# Patient Record
Sex: MALE | URBAN - METROPOLITAN AREA
[De-identification: names, ages, dates, MRNs, and addresses within clinical notes are randomized per-mention and may not be internally consistent; named-entity substitution may affect disease eponyms.]

---

## 2024-07-25 ENCOUNTER — LAB OUTSIDE AN ENCOUNTER (OUTPATIENT)
Dept: URBAN - METROPOLITAN AREA CLINIC 54 | Facility: CLINIC | Age: 59
End: 2024-07-25

## 2024-07-25 ENCOUNTER — OFFICE VISIT (OUTPATIENT)
Dept: URBAN - METROPOLITAN AREA CLINIC 54 | Facility: CLINIC | Age: 59
End: 2024-07-25
Payer: OTHER GOVERNMENT

## 2024-07-25 VITALS
SYSTOLIC BLOOD PRESSURE: 122 MMHG | HEIGHT: 73 IN | HEART RATE: 85 BPM | BODY MASS INDEX: 17.1 KG/M2 | DIASTOLIC BLOOD PRESSURE: 78 MMHG | TEMPERATURE: 98.1 F | WEIGHT: 129 LBS

## 2024-07-25 DIAGNOSIS — R19.5 PALE STOOL: ICD-10-CM

## 2024-07-25 DIAGNOSIS — E87.6 HYPOKALEMIA: ICD-10-CM

## 2024-07-25 DIAGNOSIS — K22.10 EROSIVE ESOPHAGITIS: ICD-10-CM

## 2024-07-25 DIAGNOSIS — Z12.11 COLON CANCER SCREENING: ICD-10-CM

## 2024-07-25 PROBLEM — 40719004: Status: ACTIVE | Noted: 2024-07-25

## 2024-07-25 PROCEDURE — 99204 OFFICE O/P NEW MOD 45 MIN: CPT

## 2024-07-25 PROCEDURE — 99244 OFF/OP CNSLTJ NEW/EST MOD 40: CPT

## 2024-07-25 RX ORDER — PANTOPRAZOLE SODIUM 40 MG/1
1 TABLET TABLET, DELAYED RELEASE ORAL ONCE A DAY
Status: ACTIVE | COMMUNITY

## 2024-07-25 RX ORDER — SILDENAFIL 100 MG/1
1 TABLET AS NEEDED TABLET, FILM COATED ORAL ONCE A DAY
Status: ACTIVE | COMMUNITY

## 2024-07-25 RX ORDER — POTASSIUM CHLORIDE 20 MEQ/15ML
15 ML WITH FOOD SOLUTION ORAL ONCE A DAY
Status: ACTIVE | COMMUNITY

## 2024-07-25 RX ORDER — PANTOPRAZOLE SODIUM 40 MG/1
1 TABLET TABLET, DELAYED RELEASE ORAL ONCE A DAY
OUTPATIENT

## 2024-07-25 RX ORDER — MAG HYDROX/ALUMINUM HYD/SIMETH 400-400-40
10 ML AS NEEDED SUSPENSION, ORAL (FINAL DOSE FORM) ORAL TWICE A DAY
Status: ACTIVE | COMMUNITY

## 2024-07-25 RX ORDER — FLUTICASONE PROPIONATE 50 UG/1
1 SPRAY IN EACH NOSTRIL SPRAY, METERED NASAL ONCE A DAY
Status: ACTIVE | COMMUNITY

## 2024-07-25 NOTE — HPI-TODAY'S VISIT:
7/25/24: [facility] [place] [facility] without [facility]. A copy of this document will be sent to the provider. Pt was in [facility] 6/8 - 6/12 with abd pain, bloating, nausea with vomiting up blood/ "black stuff," and fever starting 2 days prior to arrival fever. Hypokalemic, now on potassium. Had EGD 6/10 with severe esophagitis (mucosal sloughing 1/3 distal esophagus with erythema 6cm proximal?), extensive erythema in fundus, and mild duodenitis. Bx of esophagus showed ulceration with exetensive fibrpurulent exudate, no mucosa, no malinancy, fungal organisms favored oral. Otherwise bx confirmed mild duodenitis, normal gastric mucosa, no H pylori. Pt is feeling fine now, denies chronic heartburn, reflux. Has mild dysphagia. Epigastric pain has improved, denies n/v. No black stool but says it is iris colored. Denies NSAIDs, no H pylori, takes Meloxicam a few times a month but otherwise no NSAIDs. Taking pantoprazole 40 QD. No prior cscope.

## 2024-07-26 LAB
ALBUMIN/GLOBULIN RATIO: 1.1
ALBUMIN: 4
ALKALINE PHOSPHATASE: 52
ALT (SGPT): 12
AST (SGOT): 16
BILIRUBIN, DIRECT: 0.1
BILIRUBIN, INDIRECT: 0.5
BILIRUBIN, TOTAL: 0.6
GLOBULIN: 3.5
POTASSIUM: 4
PROTEIN, TOTAL: 7.5

## 2024-07-27 ENCOUNTER — DASHBOARD ENCOUNTERS (OUTPATIENT)
Age: 59
End: 2024-07-27

## 2024-07-31 ENCOUNTER — TELEPHONE ENCOUNTER (OUTPATIENT)
Dept: URBAN - METROPOLITAN AREA CLINIC 54 | Facility: CLINIC | Age: 59
End: 2024-07-31

## 2024-08-20 ENCOUNTER — TELEPHONE ENCOUNTER (OUTPATIENT)
Dept: URBAN - METROPOLITAN AREA CLINIC 54 | Facility: CLINIC | Age: 59
End: 2024-08-20

## 2024-08-20 RX ORDER — POLYETHYLENE GLYCOL 3350, SODIUM SULFATE ANHYDROUS, SODIUM BICARBONATE, SODIUM CHLORIDE, POTASSIUM CHLORIDE 236; 22.74; 6.74; 5.86; 2.97 G/4L; G/4L; G/4L; G/4L; G/4L
4000 MILLITERS POWDER, FOR SOLUTION ORAL ONCE
Qty: 4000 MILLILITER | Refills: 0 | OUTPATIENT
Start: 2024-08-20 | End: 2024-08-21

## 2024-09-24 ENCOUNTER — OFFICE VISIT (OUTPATIENT)
Dept: URBAN - METROPOLITAN AREA SURGERY CENTER 14 | Facility: SURGERY CENTER | Age: 59
End: 2024-09-24

## 2024-10-17 ENCOUNTER — OFFICE VISIT (OUTPATIENT)
Dept: URBAN - METROPOLITAN AREA CLINIC 54 | Facility: CLINIC | Age: 59
End: 2024-10-17

## 2024-12-02 ENCOUNTER — OFFICE VISIT (OUTPATIENT)
Dept: URBAN - METROPOLITAN AREA SURGERY CENTER 14 | Facility: SURGERY CENTER | Age: 59
End: 2024-12-02
Payer: OTHER GOVERNMENT

## 2024-12-02 ENCOUNTER — TELEPHONE ENCOUNTER (OUTPATIENT)
Dept: URBAN - METROPOLITAN AREA CLINIC 54 | Facility: CLINIC | Age: 59
End: 2024-12-02

## 2024-12-02 DIAGNOSIS — Z12.11 COLON CANCER SCREENING: ICD-10-CM

## 2024-12-02 DIAGNOSIS — D12.3 ADENOMA OF TRANSVERSE COLON: ICD-10-CM

## 2024-12-02 DIAGNOSIS — K21.00 REFLUX ESOPHAGITIS: ICD-10-CM

## 2024-12-02 DIAGNOSIS — R13.19 DYSPHAGIA: ICD-10-CM

## 2024-12-02 DIAGNOSIS — D12.3 BENIGN NEOPLASM OF TRANSVERSE COLON: ICD-10-CM

## 2024-12-02 DIAGNOSIS — K22.2 ESOPHAGEAL STENOSIS: ICD-10-CM

## 2024-12-02 DIAGNOSIS — K22.89 OTHER SPECIFIED DISEASE OF ESOPHAGUS: ICD-10-CM

## 2024-12-02 PROCEDURE — 43220 ESOPHAGOSCOPY BALLOON <30MM: CPT | Performed by: INTERNAL MEDICINE

## 2024-12-02 PROCEDURE — 45385 COLONOSCOPY W/LESION REMOVAL: CPT | Performed by: INTERNAL MEDICINE

## 2024-12-02 PROCEDURE — 43202 ESOPHAGOSCOPY FLEX BIOPSY: CPT | Performed by: INTERNAL MEDICINE

## 2024-12-02 PROCEDURE — 00813 ANES UPR LWR GI NDSC PX: CPT | Performed by: NURSE ANESTHETIST, CERTIFIED REGISTERED

## 2024-12-02 RX ORDER — MAG HYDROX/ALUMINUM HYD/SIMETH 400-400-40
10 ML AS NEEDED SUSPENSION, ORAL (FINAL DOSE FORM) ORAL TWICE A DAY
Status: ACTIVE | COMMUNITY

## 2024-12-02 RX ORDER — FLUTICASONE PROPIONATE 50 UG/1
1 SPRAY IN EACH NOSTRIL SPRAY, METERED NASAL ONCE A DAY
Status: ACTIVE | COMMUNITY

## 2024-12-02 RX ORDER — SILDENAFIL 100 MG/1
1 TABLET AS NEEDED TABLET, FILM COATED ORAL ONCE A DAY
Status: ACTIVE | COMMUNITY

## 2024-12-02 RX ORDER — POTASSIUM CHLORIDE 20 MEQ/15ML
15 ML WITH FOOD SOLUTION ORAL ONCE A DAY
Status: ACTIVE | COMMUNITY

## 2024-12-02 RX ORDER — PANTOPRAZOLE SODIUM 40 MG/1
1 TABLET TABLET, DELAYED RELEASE ORAL ONCE A DAY
OUTPATIENT

## 2024-12-02 RX ORDER — OMEPRAZOLE 40 MG/1
1 CAPSULE CAPSULE, DELAYED RELEASE ORAL TWICE DAILY
Qty: 180 | Refills: 0 | OUTPATIENT
Start: 2024-12-02

## 2024-12-02 RX ORDER — PANTOPRAZOLE SODIUM 40 MG/1
1 TABLET TABLET, DELAYED RELEASE ORAL ONCE A DAY
Status: ACTIVE | COMMUNITY

## 2024-12-02 RX ORDER — SUCRALFATE 1 G/10ML
10 ML 1 HOUR BEFORE MEALS AND AT BEDTIME ON AN EMPTY STOMACH SUSPENSION ORAL
Qty: 1200 | Refills: 0 | OUTPATIENT
Start: 2024-12-02 | End: 2025-01-01

## 2024-12-03 ENCOUNTER — TELEPHONE ENCOUNTER (OUTPATIENT)
Dept: URBAN - METROPOLITAN AREA CLINIC 54 | Facility: CLINIC | Age: 59
End: 2024-12-03

## 2024-12-03 RX ORDER — SUCRALFATE 1 G/10ML
10 ML 1 HOUR BEFORE MEALS AND AT BEDTIME ON AN EMPTY STOMACH SUSPENSION ORAL
Qty: 1200 | Refills: 0
Start: 2024-12-02 | End: 2025-01-02

## 2024-12-03 RX ORDER — OMEPRAZOLE 40 MG/1
1 CAPSULE CAPSULE, DELAYED RELEASE ORAL TWICE DAILY
Qty: 180 | Refills: 0
Start: 2024-12-02

## 2024-12-04 ENCOUNTER — TELEPHONE ENCOUNTER (OUTPATIENT)
Dept: URBAN - METROPOLITAN AREA CLINIC 54 | Facility: CLINIC | Age: 59
End: 2024-12-04

## 2024-12-09 ENCOUNTER — OFFICE VISIT (OUTPATIENT)
Dept: URBAN - METROPOLITAN AREA CLINIC 54 | Facility: CLINIC | Age: 59
End: 2024-12-09
Payer: OTHER GOVERNMENT

## 2024-12-09 ENCOUNTER — LAB OUTSIDE AN ENCOUNTER (OUTPATIENT)
Dept: URBAN - METROPOLITAN AREA CLINIC 54 | Facility: CLINIC | Age: 59
End: 2024-12-09

## 2024-12-09 VITALS
HEART RATE: 94 BPM | BODY MASS INDEX: 16.72 KG/M2 | TEMPERATURE: 98.1 F | HEIGHT: 73 IN | WEIGHT: 126.2 LBS | DIASTOLIC BLOOD PRESSURE: 83 MMHG | SYSTOLIC BLOOD PRESSURE: 120 MMHG

## 2024-12-09 DIAGNOSIS — K22.10 EROSIVE ESOPHAGITIS: ICD-10-CM

## 2024-12-09 DIAGNOSIS — K22.2 ESOPHAGEAL STENOSIS: ICD-10-CM

## 2024-12-09 PROBLEM — 300286002: Status: ACTIVE | Noted: 2024-12-09

## 2024-12-09 PROCEDURE — 99214 OFFICE O/P EST MOD 30 MIN: CPT

## 2024-12-09 RX ORDER — FLUTICASONE PROPIONATE 50 UG/1
1 SPRAY IN EACH NOSTRIL SPRAY, METERED NASAL ONCE A DAY
Status: ON HOLD | COMMUNITY

## 2024-12-09 RX ORDER — SILDENAFIL 100 MG/1
1 TABLET AS NEEDED TABLET, FILM COATED ORAL ONCE A DAY
Status: ON HOLD | COMMUNITY

## 2024-12-09 RX ORDER — SUCRALFATE 1 G/10ML
10 ML 1 HOUR BEFORE MEALS AND AT BEDTIME ON AN EMPTY STOMACH SUSPENSION ORAL
Qty: 1200 | Refills: 0 | Status: ON HOLD | COMMUNITY
Start: 2024-12-02 | End: 2025-01-02

## 2024-12-09 RX ORDER — MAG HYDROX/ALUMINUM HYD/SIMETH 400-400-40
10 ML AS NEEDED SUSPENSION, ORAL (FINAL DOSE FORM) ORAL TWICE A DAY
Status: ACTIVE | COMMUNITY

## 2024-12-09 RX ORDER — OMEPRAZOLE 40 MG/1
1 CAPSULE CAPSULE, DELAYED RELEASE ORAL TWICE DAILY
Qty: 180 | Refills: 0 | Status: ON HOLD | COMMUNITY
Start: 2024-12-02

## 2024-12-09 RX ORDER — POTASSIUM CHLORIDE 20 MEQ/15ML
15 ML WITH FOOD SOLUTION ORAL ONCE A DAY
Status: ON HOLD | COMMUNITY

## 2024-12-09 NOTE — HPI-TODAY'S VISIT:
7/25/24: [facility] [place] [facility] without [facility]. A copy of this document will be sent to the provider. Pt was in [facility] 6/8 - 6/12 with abd pain, bloating, nausea with vomiting up blood/ "black stuff," and fever starting 2 days prior to arrival fever. Hypokalemic, now on potassium. Had EGD 6/10 with severe esophagitis (mucosal sloughing 1/3 distal esophagus with erythema 6cm proximal?), extensive erythema in fundus, and mild duodenitis. Bx of esophagus showed ulceration with exetensive fibrpurulent exudate, no mucosa, no malinancy, fungal organisms favored oral. Otherwise bx confirmed mild duodenitis, normal gastric mucosa, no H pylori. Pt is feeling fine now, denies chronic heartburn, reflux. Has mild dysphagia. Epigastric pain has improved, denies n/v. No black stool but says it is iris colored. Denies NSAIDs, no H pylori, takes Meloxicam a few times a month but otherwise no NSAIDs. Taking pantoprazole 40 QD. No prior cscope.  12/9/24 Follow Up: Patient returns for EGD/cscope follow up, detailed below. Severe esophageal stenosis noted at 6mm diameter, dilated with CRE balloon to max 7.5 mm. Pt has had some improvement in dysphagia, but still only tolerates pureed diet (yogurt, mashed potatoes, ice cream). Switched from pantoprazole 40 QD to omeprazole 40 BID and carafate suspension 1g QID (still waiting for shipment). Repeat EGD recommended after 4 weeks. One colon TA, repeat in 7 years.  EGD 12/2/24: - Esophageal stenosis. Dilated with a 6-7-8 mm x 5.5 cm CRE balloon (to a maximum balloon size of 6 mm). Dilated with a 6-7-8 mm x 5.5 cm CRE balloon (to a maximum balloon size of 7 mm). Dilated with a 6-7-8 mm x 5.5 cm CRE balloon (to amaximum balloon size of 7.5 mm). - Esophageal mucosal changes were present, including congestion (edema). Findings are suggestive of inflammation. Biopsied. Biopsy: Squamous epithelium with reflux esophagitis.Scattered intraepithelial eosinophils present (max 3 eos/HPF in distal and 4 eos/HPF in proximal esophagus).No glandular epithelium present. An Alcian blue/PAS stain is negative for both fungal elements and intestinal metaplasia  Colonoscopy 12/2/24:  - One 5 mm polyp in the transverse colon, removed with a cold snare. Resected and retrieved. - Internal hemorrhoids. - The examination was otherwise normal on direct and retroflexion views. Biopsy: Tubular adenoma.

## 2025-01-02 ENCOUNTER — CLAIMS CREATED FROM THE CLAIM WINDOW (OUTPATIENT)
Dept: URBAN - METROPOLITAN AREA CLINIC 4 | Facility: CLINIC | Age: 60
End: 2025-01-02
Payer: OTHER GOVERNMENT

## 2025-01-02 ENCOUNTER — CLAIMS CREATED FROM THE CLAIM WINDOW (OUTPATIENT)
Dept: URBAN - METROPOLITAN AREA SURGERY CENTER 14 | Facility: SURGERY CENTER | Age: 60
End: 2025-01-02
Payer: OTHER GOVERNMENT

## 2025-01-02 DIAGNOSIS — K31.89 OTHER DISEASES OF STOMACH AND DUODENUM: ICD-10-CM

## 2025-01-02 DIAGNOSIS — K22.2 ESOPHAGEAL STENOSIS: ICD-10-CM

## 2025-01-02 DIAGNOSIS — K44.9 LARGE HIATAL HERNIA: ICD-10-CM

## 2025-01-02 DIAGNOSIS — K44.9 HIATAL HERNIA: ICD-10-CM

## 2025-01-02 DIAGNOSIS — K22.10 EROSIVE ESOPHAGITIS: ICD-10-CM

## 2025-01-02 PROCEDURE — 43249 ESOPH EGD DILATION <30 MM: CPT | Performed by: INTERNAL MEDICINE

## 2025-01-02 PROCEDURE — 43239 EGD BIOPSY SINGLE/MULTIPLE: CPT | Performed by: INTERNAL MEDICINE

## 2025-01-02 PROCEDURE — 00731 ANES UPR GI NDSC PX NOS: CPT | Performed by: NURSE ANESTHETIST, CERTIFIED REGISTERED

## 2025-01-02 PROCEDURE — 88305 TISSUE EXAM BY PATHOLOGIST: CPT | Performed by: PATHOLOGY

## 2025-01-02 PROCEDURE — 88342 IMHCHEM/IMCYTCHM 1ST ANTB: CPT | Performed by: PATHOLOGY

## 2025-01-02 RX ORDER — MAG HYDROX/ALUMINUM HYD/SIMETH 400-400-40
10 ML AS NEEDED SUSPENSION, ORAL (FINAL DOSE FORM) ORAL TWICE A DAY
Status: ACTIVE | COMMUNITY

## 2025-01-02 RX ORDER — FLUTICASONE PROPIONATE 50 UG/1
1 SPRAY IN EACH NOSTRIL SPRAY, METERED NASAL ONCE A DAY
Status: ON HOLD | COMMUNITY

## 2025-01-02 RX ORDER — SILDENAFIL 100 MG/1
1 TABLET AS NEEDED TABLET, FILM COATED ORAL ONCE A DAY
Status: ON HOLD | COMMUNITY

## 2025-01-02 RX ORDER — OMEPRAZOLE 40 MG/1
1 CAPSULE CAPSULE, DELAYED RELEASE ORAL TWICE DAILY
Qty: 180 | Refills: 0 | Status: ON HOLD | COMMUNITY
Start: 2024-12-02

## 2025-01-02 RX ORDER — SUCRALFATE 1 G/10ML
10 ML 1 HOUR BEFORE MEALS AND AT BEDTIME ON AN EMPTY STOMACH SUSPENSION ORAL
Qty: 1200 | Refills: 0 | Status: ON HOLD | COMMUNITY
Start: 2024-12-02 | End: 2025-01-02

## 2025-01-02 RX ORDER — POTASSIUM CHLORIDE 20 MEQ/15ML
15 ML WITH FOOD SOLUTION ORAL ONCE A DAY
Status: ON HOLD | COMMUNITY

## 2025-01-06 ENCOUNTER — TELEPHONE ENCOUNTER (OUTPATIENT)
Dept: URBAN - METROPOLITAN AREA CLINIC 82 | Facility: CLINIC | Age: 60
End: 2025-01-06

## 2025-01-06 ENCOUNTER — LAB OUTSIDE AN ENCOUNTER (OUTPATIENT)
Dept: URBAN - METROPOLITAN AREA CLINIC 82 | Facility: CLINIC | Age: 60
End: 2025-01-06

## 2025-02-04 ENCOUNTER — CLAIMS CREATED FROM THE CLAIM WINDOW (OUTPATIENT)
Dept: URBAN - METROPOLITAN AREA SURGERY CENTER 14 | Facility: SURGERY CENTER | Age: 60
End: 2025-02-04
Payer: OTHER GOVERNMENT

## 2025-02-04 ENCOUNTER — TELEPHONE ENCOUNTER (OUTPATIENT)
Dept: URBAN - METROPOLITAN AREA CLINIC 54 | Facility: CLINIC | Age: 60
End: 2025-02-04

## 2025-02-04 ENCOUNTER — LAB OUTSIDE AN ENCOUNTER (OUTPATIENT)
Dept: URBAN - METROPOLITAN AREA CLINIC 54 | Facility: CLINIC | Age: 60
End: 2025-02-04

## 2025-02-04 DIAGNOSIS — K44.9 HIATAL HERNIA: ICD-10-CM

## 2025-02-04 DIAGNOSIS — K22.2 ESOPHAGEAL OBSTRUCTION: ICD-10-CM

## 2025-02-04 DIAGNOSIS — K22.2 ESOPHAGEAL STENOSIS: ICD-10-CM

## 2025-02-04 PROBLEM — 40739000: Status: ACTIVE | Noted: 2025-02-04

## 2025-02-04 PROCEDURE — 43249 ESOPH EGD DILATION <30 MM: CPT | Performed by: INTERNAL MEDICINE

## 2025-02-04 PROCEDURE — 00731 ANES UPR GI NDSC PX NOS: CPT

## 2025-02-04 RX ORDER — MAG HYDROX/ALUMINUM HYD/SIMETH 400-400-40
10 ML AS NEEDED SUSPENSION, ORAL (FINAL DOSE FORM) ORAL TWICE A DAY
Status: ACTIVE | COMMUNITY

## 2025-02-04 RX ORDER — POTASSIUM CHLORIDE 20 MEQ/15ML
15 ML WITH FOOD SOLUTION ORAL ONCE A DAY
Status: ON HOLD | COMMUNITY

## 2025-02-04 RX ORDER — FLUTICASONE PROPIONATE 50 UG/1
1 SPRAY IN EACH NOSTRIL SPRAY, METERED NASAL ONCE A DAY
Status: ON HOLD | COMMUNITY

## 2025-02-04 RX ORDER — SILDENAFIL 100 MG/1
1 TABLET AS NEEDED TABLET, FILM COATED ORAL ONCE A DAY
Status: ON HOLD | COMMUNITY

## 2025-02-04 RX ORDER — OMEPRAZOLE 40 MG/1
1 CAPSULE CAPSULE, DELAYED RELEASE ORAL TWICE DAILY
Qty: 180 | Refills: 0 | Status: ON HOLD | COMMUNITY
Start: 2024-12-02

## 2025-02-18 ENCOUNTER — OFFICE VISIT (OUTPATIENT)
Dept: URBAN - METROPOLITAN AREA CLINIC 54 | Facility: CLINIC | Age: 60
End: 2025-02-18

## 2025-03-04 ENCOUNTER — CLAIMS CREATED FROM THE CLAIM WINDOW (OUTPATIENT)
Dept: URBAN - METROPOLITAN AREA SURGERY CENTER 14 | Facility: SURGERY CENTER | Age: 60
End: 2025-03-04
Payer: OTHER GOVERNMENT

## 2025-03-04 ENCOUNTER — CLAIMS CREATED FROM THE CLAIM WINDOW (OUTPATIENT)
Dept: URBAN - METROPOLITAN AREA CLINIC 4 | Facility: CLINIC | Age: 60
End: 2025-03-04
Payer: OTHER GOVERNMENT

## 2025-03-04 ENCOUNTER — TELEPHONE ENCOUNTER (OUTPATIENT)
Dept: URBAN - METROPOLITAN AREA CLINIC 54 | Facility: CLINIC | Age: 60
End: 2025-03-04

## 2025-03-04 DIAGNOSIS — K22.2 ESOPHAGEAL OBSTRUCTION: ICD-10-CM

## 2025-03-04 DIAGNOSIS — R13.19 CERVICAL DYSPHAGIA: ICD-10-CM

## 2025-03-04 DIAGNOSIS — K31.89 OTHER DISEASES OF STOMACH AND DUODENUM: ICD-10-CM

## 2025-03-04 PROCEDURE — 43239 EGD BIOPSY SINGLE/MULTIPLE: CPT | Performed by: INTERNAL MEDICINE

## 2025-03-04 PROCEDURE — 88305 TISSUE EXAM BY PATHOLOGIST: CPT | Performed by: PATHOLOGY

## 2025-03-04 PROCEDURE — 43249 ESOPH EGD DILATION <30 MM: CPT | Performed by: INTERNAL MEDICINE

## 2025-03-04 PROCEDURE — 00731 ANES UPR GI NDSC PX NOS: CPT | Performed by: NURSE ANESTHETIST, CERTIFIED REGISTERED

## 2025-03-04 PROCEDURE — 88312 SPECIAL STAINS GROUP 1: CPT | Performed by: PATHOLOGY

## 2025-03-04 RX ORDER — SILDENAFIL 100 MG/1
1 TABLET AS NEEDED TABLET, FILM COATED ORAL ONCE A DAY
Status: ON HOLD | COMMUNITY

## 2025-03-04 RX ORDER — POTASSIUM CHLORIDE 20 MEQ/15ML
15 ML WITH FOOD SOLUTION ORAL ONCE A DAY
Status: ON HOLD | COMMUNITY

## 2025-03-04 RX ORDER — MAG HYDROX/ALUMINUM HYD/SIMETH 400-400-40
10 ML AS NEEDED SUSPENSION, ORAL (FINAL DOSE FORM) ORAL TWICE A DAY
Status: ACTIVE | COMMUNITY

## 2025-03-04 RX ORDER — OMEPRAZOLE 40 MG/1
1 CAPSULE CAPSULE, DELAYED RELEASE ORAL TWICE DAILY
Qty: 180 | Refills: 0 | Status: ON HOLD | COMMUNITY
Start: 2024-12-02

## 2025-03-04 RX ORDER — FLUTICASONE PROPIONATE 50 UG/1
1 SPRAY IN EACH NOSTRIL SPRAY, METERED NASAL ONCE A DAY
Status: ON HOLD | COMMUNITY

## 2025-03-24 ENCOUNTER — OFFICE VISIT (OUTPATIENT)
Dept: URBAN - METROPOLITAN AREA TELEHEALTH 2 | Facility: TELEHEALTH | Age: 60
End: 2025-03-24
Payer: OTHER GOVERNMENT

## 2025-03-24 DIAGNOSIS — K22.10 EROSIVE ESOPHAGITIS: ICD-10-CM

## 2025-03-24 DIAGNOSIS — K22.2 ESOPHAGEAL STENOSIS: ICD-10-CM

## 2025-03-24 PROCEDURE — 99214 OFFICE O/P EST MOD 30 MIN: CPT | Performed by: PHYSICIAN ASSISTANT

## 2025-03-24 RX ORDER — MAG HYDROX/ALUMINUM HYD/SIMETH 400-400-40
10 ML AS NEEDED SUSPENSION, ORAL (FINAL DOSE FORM) ORAL TWICE A DAY
Status: ACTIVE | COMMUNITY

## 2025-03-24 RX ORDER — OMEPRAZOLE 40 MG/1
1 CAPSULE CAPSULE, DELAYED RELEASE ORAL TWICE DAILY
Qty: 180 | Refills: 0
Start: 2024-12-02

## 2025-03-24 RX ORDER — POTASSIUM CHLORIDE 20 MEQ/15ML
15 ML WITH FOOD SOLUTION ORAL ONCE A DAY
Status: ON HOLD | COMMUNITY

## 2025-03-24 RX ORDER — OMEPRAZOLE 40 MG/1
1 CAPSULE CAPSULE, DELAYED RELEASE ORAL TWICE DAILY
Qty: 180 | Refills: 0 | Status: ON HOLD | COMMUNITY
Start: 2024-12-02

## 2025-03-24 RX ORDER — SILDENAFIL 100 MG/1
1 TABLET AS NEEDED TABLET, FILM COATED ORAL ONCE A DAY
Status: ON HOLD | COMMUNITY

## 2025-03-24 RX ORDER — FLUTICASONE PROPIONATE 50 UG/1
1 SPRAY IN EACH NOSTRIL SPRAY, METERED NASAL ONCE A DAY
Status: ON HOLD | COMMUNITY

## 2025-03-24 NOTE — HPI-TODAY'S VISIT:
7/25/24: [facility] [place] [facility] without [facility]. A copy of this document will be sent to the provider. Pt was in [facility] 6/8 - 6/12 with abd pain, bloating, nausea with vomiting up blood/ "black stuff," and fever starting 2 days prior to arrival fever. Hypokalemic, now on potassium. Had EGD 6/10 with severe esophagitis (mucosal sloughing 1/3 distal esophagus with erythema 6cm proximal?), extensive erythema in fundus, and mild duodenitis. Bx of esophagus showed ulceration with exetensive fibrpurulent exudate, no mucosa, no malinancy, fungal organisms favored oral. Otherwise bx confirmed mild duodenitis, normal gastric mucosa, no H pylori. Pt is feeling fine now, denies chronic heartburn, reflux. Has mild dysphagia. Epigastric pain has improved, denies n/v. No black stool but says it is iris colored. Denies NSAIDs, no H pylori, takes Meloxicam a few times a month but otherwise no NSAIDs. Taking pantoprazole 40 QD. No prior cscope.  12/9/24 Follow Up: Patient returns for EGD/cscope follow up, detailed below. Severe esophageal stenosis noted at 6mm diameter, dilated with CRE balloon to max 7.5 mm. Pt has had some improvement in dysphagia, but still only tolerates pureed diet (yogurt, mashed potatoes, ice cream). Switched from pantoprazole 40 QD to omeprazole 40 BID and carafate suspension 1g QID (still waiting for shipment). Repeat EGD recommended after 4 weeks. One colon TA, repeat in 7 years.  EGD 12/2/24: - Esophageal stenosis. Dilated with a 6-7-8 mm x 5.5 cm CRE balloon (to a maximum balloon size of 6 mm). Dilated with a 6-7-8 mm x 5.5 cm CRE balloon (to a maximum balloon size of 7 mm). Dilated with a 6-7-8 mm x 5.5 cm CRE balloon (to amaximum balloon size of 7.5 mm). - Esophageal mucosal changes were present, including congestion (edema). Findings are suggestive of inflammation. Biopsied. Biopsy: Squamous epithelium with reflux esophagitis.Scattered intraepithelial eosinophils present (max 3 eos/HPF in distal and 4 eos/HPF in proximal esophagus).No glandular epithelium present. An Alcian blue/PAS stain is negative for both fungal elements and intestinal metaplasia  Colonoscopy 12/2/24:  - One 5 mm polyp in the transverse colon, removed with a cold snare. Resected and retrieved. - Internal hemorrhoids. - The examination was otherwise normal on direct and retroflexion views. Biopsy: Tubular adenoma.  3/20/2025: Patient presents for routine follow-up of dysphagia.  Patient has undergone serial EGDs every 4 weeks since 12/2/2024.  Endoscopies are detailed below. Patient remains on omeprazole 40 mg BID w/o heartburn symptoms. He can eat most foods including cooked vegetables and chicken. He cannot tolerate steak.   EGD 1/2/2025: -Benign-appearing esophageal stenosis.  Dilated with a 8-9-10 millimeter balloon (to a maximum balloon size of 8 mm).  Dilated with an 8-9-10 balloon (to a maximum balloon size of 9 mm).  Dilated with a 8-9-10 millimeter balloon (to a maximum balloon size of 9.5 mm).  Pale esophageal mucosa with healed esophagitis above the stricture.   -Large hiatal hernia. -Gastric mucosal atrophy.  Biopsied. -Normal examined duodenum. Biopsy: Normal stomach.  EGD 2/4/25: -Benign-appearing esophageal stenosis.  Dilated with a 10-11-12 mm x 8 cm balloon (to a maximum balloon size of 10 mm).  Dilated with an 10-11-12 balloon (to a maximum balloon size of 11 mm). Moderate mucosal disruption identified.  -Large hiatal hernia. -Normal stomach. -Normal examined duodenum.  EGD 3/4/25: -Benign-appearing esophageal stenosis. Dilated with CRE balloons (12, 13.5, and 15 mm). Moderate mucosal disruption noted.  -Large hiatal hernia. - A small amount of food (residue) in the stomach. -Normal stomach. Biopsied -Normal examined duodenum. Biopsy: Normal stomach.

## 2025-06-23 ENCOUNTER — OFFICE VISIT (OUTPATIENT)
Dept: URBAN - METROPOLITAN AREA TELEHEALTH 2 | Facility: TELEHEALTH | Age: 60
End: 2025-06-23
Payer: OTHER GOVERNMENT

## 2025-06-23 DIAGNOSIS — K22.2 ESOPHAGEAL STENOSIS: ICD-10-CM

## 2025-06-23 DIAGNOSIS — K44.9 LARGE HIATAL HERNIA: ICD-10-CM

## 2025-06-23 DIAGNOSIS — K22.10 EROSIVE ESOPHAGITIS: ICD-10-CM

## 2025-06-23 PROCEDURE — 99214 OFFICE O/P EST MOD 30 MIN: CPT | Performed by: PHYSICIAN ASSISTANT

## 2025-06-23 RX ORDER — MAG HYDROX/ALUMINUM HYD/SIMETH 400-400-40
10 ML AS NEEDED SUSPENSION, ORAL (FINAL DOSE FORM) ORAL TWICE A DAY
Status: ACTIVE | COMMUNITY

## 2025-06-23 RX ORDER — SILDENAFIL 100 MG/1
1 TABLET AS NEEDED TABLET, FILM COATED ORAL ONCE A DAY
Status: ON HOLD | COMMUNITY

## 2025-06-23 RX ORDER — OMEPRAZOLE 40 MG/1
1 CAPSULE CAPSULE, DELAYED RELEASE ORAL ONCE A DAY
Qty: 90 CAPSULE | Refills: 0
Start: 2025-06-23

## 2025-06-23 RX ORDER — FLUTICASONE PROPIONATE 50 UG/1
1 SPRAY IN EACH NOSTRIL SPRAY, METERED NASAL ONCE A DAY
Status: ON HOLD | COMMUNITY

## 2025-06-23 RX ORDER — OMEPRAZOLE 40 MG/1
1 CAPSULE CAPSULE, DELAYED RELEASE ORAL TWICE DAILY
Qty: 180 | Refills: 0 | Status: ACTIVE | COMMUNITY
Start: 2024-12-02

## 2025-06-23 RX ORDER — POTASSIUM CHLORIDE 20 MEQ/15ML
15 ML WITH FOOD SOLUTION ORAL ONCE A DAY
Status: ON HOLD | COMMUNITY

## 2025-06-23 NOTE — HPI-TODAY'S VISIT:
7/25/24: [facility] [place] [facility] without [facility]. A copy of this document will be sent to the provider. Pt was in [facility] 6/8 - 6/12 with abd pain, bloating, nausea with vomiting up blood/ "black stuff," and fever starting 2 days prior to arrival fever. Hypokalemic, now on potassium. Had EGD 6/10 with severe esophagitis (mucosal sloughing 1/3 distal esophagus with erythema 6cm proximal?), extensive erythema in fundus, and mild duodenitis. Bx of esophagus showed ulceration with exetensive fibrpurulent exudate, no mucosa, no malinancy, fungal organisms favored oral. Otherwise bx confirmed mild duodenitis, normal gastric mucosa, no H pylori. Pt is feeling fine now, denies chronic heartburn, reflux. Has mild dysphagia. Epigastric pain has improved, denies n/v. No black stool but says it is iris colored. Denies NSAIDs, no H pylori, takes Meloxicam a few times a month but otherwise no NSAIDs. Taking pantoprazole 40 QD. No prior cscope.  12/9/24 Follow Up: Patient returns for EGD/cscope follow up, detailed below. Severe esophageal stenosis noted at 6mm diameter, dilated with CRE balloon to max 7.5 mm. Pt has had some improvement in dysphagia, but still only tolerates pureed diet (yogurt, mashed potatoes, ice cream). Switched from pantoprazole 40 QD to omeprazole 40 BID and carafate suspension 1g QID (still waiting for shipment). Repeat EGD recommended after 4 weeks. One colon TA, repeat in 7 years.  EGD 12/2/24: - Esophageal stenosis. Dilated with a 6-7-8 mm x 5.5 cm CRE balloon (to a maximum balloon size of 6 mm). Dilated with a 6-7-8 mm x 5.5 cm CRE balloon (to a maximum balloon size of 7 mm). Dilated with a 6-7-8 mm x 5.5 cm CRE balloon (to amaximum balloon size of 7.5 mm). - Esophageal mucosal changes were present, including congestion (edema). Findings are suggestive of inflammation. Biopsied. Biopsy: Squamous epithelium with reflux esophagitis.Scattered intraepithelial eosinophils present (max 3 eos/HPF in distal and 4 eos/HPF in proximal esophagus).No glandular epithelium present. An Alcian blue/PAS stain is negative for both fungal elements and intestinal metaplasia  Colonoscopy 12/2/24:  - One 5 mm polyp in the transverse colon, removed with a cold snare. Resected and retrieved. - Internal hemorrhoids. - The examination was otherwise normal on direct and retroflexion views. Biopsy: Tubular adenoma.  3/20/2025: Patient presents for routine follow-up of dysphagia.  Patient has undergone serial EGDs every 4 weeks since 12/2/2024.  Endoscopies are detailed below. Patient remains on omeprazole 40 mg BID w/o heartburn symptoms. He can eat most foods including cooked vegetables and chicken. He cannot tolerate steak.   EGD 1/2/2025: -Benign-appearing esophageal stenosis.  Dilated with a 8-9-10 millimeter balloon (to a maximum balloon size of 8 mm).  Dilated with an 8-9-10 balloon (to a maximum balloon size of 9 mm).  Dilated with a 8-9-10 millimeter balloon (to a maximum balloon size of 9.5 mm).  Pale esophageal mucosa with healed esophagitis above the stricture.   -Large hiatal hernia. -Gastric mucosal atrophy.  Biopsied. -Normal examined duodenum. Biopsy: Normal stomach.  EGD 2/4/25: -Benign-appearing esophageal stenosis.  Dilated with a 10-11-12 mm x 8 cm balloon (to a maximum balloon size of 10 mm).  Dilated with an 10-11-12 balloon (to a maximum balloon size of 11 mm). Moderate mucosal disruption identified.  -Large hiatal hernia. -Normal stomach. -Normal examined duodenum.  EGD 3/4/25: -Benign-appearing esophageal stenosis. Dilated with CRE balloons (12, 13.5, and 15 mm). Moderate mucosal disruption noted.  -Large hiatal hernia. - A small amount of food (residue) in the stomach. -Normal stomach. Biopsied -Normal examined duodenum. Biopsy: Normal stomach.  6/23/25: Patient presents via telehealth for follow-up as esophageal dysphagia.  Patient has underwent serial endoscopies with last EGD in March 2025.  He has remained on PPI without ongoing dysphagia.  He states his weight has been maintained at 134 pounds.  He would prefer close follow-up to ensure no issues and need for repeat EGD.